# Patient Record
Sex: FEMALE | Race: WHITE
[De-identification: names, ages, dates, MRNs, and addresses within clinical notes are randomized per-mention and may not be internally consistent; named-entity substitution may affect disease eponyms.]

---

## 2017-03-14 NOTE — XR
EXAMINATION TYPE: XR foot complete RT

 

DATE OF EXAM: 3/13/2017 1:53 PM

 

CLINICAL HISTORY: Right foot pain in particular metatarsal pain for 6 months.

 

TECHNIQUE: Frontal, lateral, and oblique images of the right foot are obtained.

 

COMPARISON: None

 

FINDINGS:  There is no acute fracture/dislocation evident in the right foot.  The joint spaces in the
 right foot appear within normal limits. There is accessory ossicle at proximal medial base of navicu
lar bone or accessory navicular bone noted. The overlying soft tissue appears unremarkable.

 

IMPRESSION: Accessory navicular bone otherwise unremarkable study.

## 2017-03-22 NOTE — US
EXAMINATION TYPE: US abdomen complete

 

DATE OF EXAM: 3/22/2017 7:18 AM

 

COMPARISON: No previous

 

CLINICAL HISTORY: DR19.7 Diarrhea. Intermittent abdomen pain and diarrhea x 6 months

 

EXAM MEASUREMENTS:

 

Liver Length:  18.3 cm   

Gallbladder Wall:  0.3 cm   

CBD:  0.4 cm

Spleen:  12.4 cm   

Right Kidney:  12.1 x 5.6 x 6.2 cm 

Left Kidney:  11.7 x 5.5 x 5.0 cm   

 

TECHNOLOGIST IMPRESSION:  Technically difficult and limited study due to patient's body habitus

 

Pancreas:  visualized portions wnl, tail limited by overlying midline bowel gas

Liver:  enlarged at 18.3cm  the liver echotexture is coarse.

Gallbladder:  wnl

**Evidence for sonographic Calixto's sign:  yes

CBD:  visualized portions wnl, limited by overlying bowel gas 

Spleen:  visualized portions wnl, limited by rib shadowing and overlying bowel gas   

Right Kidney:  0.4cm echogenic focus mid pole   

Left Kidney:  wnl   

Upper IVC:  wnl  

Abd Aorta:  visualized portions wnl, limited by overlying midline bowel gas

 

There is no ascites.

 

The liver is homogenous.  The intrahepatic portion of the IVC and proximal abdominal aorta are within
 normal limits.  There is no evidence of cholelithiasis.  Common bile duct is unremarkable.  The visu
alized portions of the pancreas are homogenous.  The spleen is unremarkable.  Kidneys are symmetric a
nd free of hydronephrosis.  No renal lesions are seen.

 

IMPRESSION: Correlate for fatty infiltration of the liver. Difficult to exclude small nonobstructive 
calculus right kidney although findings are indeterminate. Technologist reports a positive sonographi
c Calixto's sign. Exam is limited.

## 2017-04-20 NOTE — MR
MR right foot

 

HISTORY: Pain in right foot

 

multiplanar multisequence imaging through the right foot, patient received 20 cc MultiHance IV

 

Comparison the right foot 13 March 2017

 

Soft tissue swelling is noted, there is subcutaneous edema present especially at the first digit vola
r aspect. The medial aspect of the foot level of the mid diaphysis of the first digit there is a sing
le irregularity present possibly susceptibility artifact, correlate with open wound or possibly forei
gn body within the skin. There is subcutaneous edema noted as well at the dorsum of the foot peripher
ally, and the level of the second digit more distally. Bone marrow signal is within normal limits. Fl
exor hallucis musculotendinous junction show some local edema, focal fluid signal within the substanc
e of the flexor hallucis longus at the level of the proximal phalangeal head and neck, coronal T2 miguel angel
ge 9 and sagittal STIR image 6 suggests an intrasubstance tear, no retracted tear. Some mild degenera
tive change present at the metatarsophalangeal joint of the first digit. Fluid signal is undercutting
 the plantar base of the first proximal phalanx compatible with normal synovial recess in the absence
 of injury, sagittal STIR image 5. However, given the adjacent soft tissue swelling, correlate with p
hysical exam findings and patient history to exclude a subtle partial tear of the plantar plate. Lisf
ranc ligament is intact.

 

Within the calcaneus there is some intermediate mixed signal on T1, increased signal on T2-weighted s
equences may represent prominent vascular remnants within the calcaneal body, possibly subchondral ge
ode which is incompletely evaluated. No evident abscess.

 

Intermetatarsal bursal effusions between the first through fourth toes.

 

IMPRESSION: Nonspecific soft tissue swelling. Additional findings above. Correlate clinically as desc
ribed. Possible skin abnormality as described, correlate.

## 2017-04-20 NOTE — MR
EXAMINATION TYPE: MR ankle RT wo/w con

 

DATE OF EXAM: 4/19/2017 8:33 PM

 

COMPARISON: NONE

 

HISTORY: Pain in Right ankle and joint of right kar

 

CONTRAST: 

Standard multiplanar, multisequence MRI departmental protocol utilizing 20 mL intravenous MultiHance 
gadolinium contrast.  

 

FINDINGS: Within the anterior portion of the os calcis there is a mixed signal T2 lesion which is of 
predominantly increased signal on T1-weighted imaging and fails to enhance following the administrati
on of contrast. There is no evidence for expansion or cortical disruption. The findings are nonspecif
ic however could reflect intraosseous ganglion cyst, atypical lipoma. The bone cyst and giant cell tu
mor. BE unlikely. No additional calcaneal lesions are seen. The ankle mortise is intact. Achilles ten
don has a normal appearance. Medial and lateral tendinous groups are intact. Osseous structures are o
therwise of normal signal without evidence for fracture or additional osseous lesion.

 

IMPRESSION: 

Nonspecific lesion of the right os calcis.

## 2019-09-14 ENCOUNTER — HOSPITAL ENCOUNTER (EMERGENCY)
Dept: HOSPITAL 47 - EC | Age: 29
Discharge: HOME | End: 2019-09-14
Payer: COMMERCIAL

## 2019-09-14 VITALS
SYSTOLIC BLOOD PRESSURE: 113 MMHG | TEMPERATURE: 98.8 F | RESPIRATION RATE: 18 BRPM | DIASTOLIC BLOOD PRESSURE: 74 MMHG | HEART RATE: 74 BPM

## 2019-09-14 DIAGNOSIS — M54.5: ICD-10-CM

## 2019-09-14 DIAGNOSIS — Z88.6: ICD-10-CM

## 2019-09-14 DIAGNOSIS — G89.29: ICD-10-CM

## 2019-09-14 DIAGNOSIS — Z87.39: ICD-10-CM

## 2019-09-14 DIAGNOSIS — M79.651: Primary | ICD-10-CM

## 2019-09-14 DIAGNOSIS — A49.02: ICD-10-CM

## 2019-09-14 DIAGNOSIS — F17.200: ICD-10-CM

## 2019-09-14 DIAGNOSIS — Z88.0: ICD-10-CM

## 2019-09-14 PROCEDURE — 99283 EMERGENCY DEPT VISIT LOW MDM: CPT

## 2019-09-14 NOTE — US
EXAMINATION TYPE: US venous doppler duplex LE RT

 

DATE OF EXAM: 9/14/2019 5:15 PM

 

COMPARISON: NONE

 

CLINICAL HISTORY: 29-year-old female Right thigh pain. Right leg pain.

 

SIDE PERFORMED: Right  

 

TECHNIQUE:  The lower extremity deep venous system is examined utilizing real time linear array sonog
jahaira with graded compression, doppler sonography and color-flow sonography.

 

FINDINGS:

 

VESSELS IMAGED:

External Iliac Vein (EIV)

Common Femoral Vein

Deep Femoral Vein

Greater Saphenous Vein *

Femoral Vein

Popliteal Vein

Small Saphenous Vein *

Proximal Calf Veins

(* superficial vessels)

 

 

 

Right Leg:  Negative for DVT

 

 

 

 

 

IMPRESSION:

No evidence for DVT within the right lower extremity imaged from the groin to the upper calf.

## 2019-09-14 NOTE — ED
General Adult HPI





- General


Chief complaint: Extremity Injury, Lower


Stated complaint: rt leg swelling


Time Seen by Provider: 09/14/19 16:25


Source: patient, family, RN notes reviewed


Mode of arrival: ambulatory


Limitations: no limitations





- History of Present Illness


Initial comments: 





Chief complaint and history of present illness a 29-year-old female here with 

her significant other.  The patient reports she has tenderness and discomfort to

her medial right thigh.  The patient reports she's recently started treatment 

for a repeat MRSA infection to her left cheek.  She's been resting it for 

several days.  Patient's weight she may have a clot in the right leg.  She also 

has had a chronic issue with low back discomfort no new injuries.  She has had 

sciatic distribution discomfort in the past.  No difficulties urinating or bowel

movements.  No rashes no direct injury.





- Related Data


                                Home Medications











 Medication  Instructions  Recorded  Confirmed


 


Acetaminophen Tab [Tylenol Tab] 1,000 mg PO Q6H PRN 09/14/19 09/14/19


 


Ibuprofen [Motrin Ib] 400 mg PO Q6H PRN 09/14/19 09/14/19


 


L.acidoph,Paracasei, B.lactis 1 cap PO BID 09/14/19 09/14/19





[Probiotic]   


 


Multivitamins, Thera [Multivitamin 1 tab PO HS 09/14/19 09/14/19





(formulary)]   


 


Sulfamethox-Tmp 800-160Mg [Bactrim 1 tab PO Q12H 09/14/19 09/14/19





-160 mg]   











                                    Allergies











Allergy/AdvReac Type Severity Reaction Status Date / Time


 


ketorolac [From Toradol] Allergy  Itching Verified 09/14/19 16:56


 


Penicillins Allergy  Swelling Verified 09/14/19 16:56














Review of Systems


ROS Statement: 


Those systems with pertinent positive or pertinent negative responses have been 

documented in the HPI.


Review of systems.  Patient denies any headache no visual acuity changes she has

discomfort to her left cheek where she has a MRSA infection currently being 

treated with Bactrim.  Denies sore throat no neck pain no chest pain no 

shortness of breath no abdominal pain.  She has chronic low back discomfort.  

She reports a history of sciatic discomfort.   No numbness no tingling to the 

leg.  Full motion of upper and lower extremities no joint pain.  All systems 

reviewed.





Past medical problems significant irritable bowel syndrome, currently not a 

problem.  Her surgeries include tonsils and adenoids she's had multiple I&D's 

for her MRSA infections she's had an EGD and a colonoscopy.  Family history 

significant for a mother who had lymphoma father had skin cancers of unknown t

ype.  The patient has ALLERGIES to ketorolac caused a rash and penicillins which

she cannot remember she was young that time.  The patient does smoke strongly 

encouraged to stop she denies alcohol use.


ROS Other: All systems not noted in ROS Statement are negative.





Past Medical History


Past Medical History: Osteoarthritis (OA)


Additional Past Medical History / Comment(s): IBS


History of Any Multi-Drug Resistant Organisms: MRSA


Date of last positivie culture/infection: 9/12/19


Past Surgical History: Adenoidectomy, Tonsillectomy


Additional Past Surgical History / Comment(s): multiple I&D, EGD colonoscopy


Past Psychological History: Anxiety, Bipolar


Smoking Status: Current every day smoker


Past Alcohol Use History: None Reported


Past Drug Use History: None Reported





General Exam





- General Exam Comments


Initial Comments: 





General:


The patient is awake and alert, here with her discomfort to her anterior right 

thigh.  Mildly increases with palpation decreases with the muscle flexed and 

palpation.  Vital signs show temperature of 98.8 pulse 74 respiratory rate 18 

pulse ox 98% room air blood pressure 113/74.


Eye:


Pupils are equal, round and reactive to light, extra-ocular movements are 

intact; there is normal conjunctiva bilaterally. No signs of icterus. 


Ears, nose, mouth and throat:


There are moist mucous membranes and no oral lesions.  MRSA infection left 

cheek.  Currently being treated with Bactrim.


Neck:


The neck is supple, there is no tenderness, no anterior cervical lymphadenopathy

appreciated.


Cardiovascular:


There is a regular rate and rhythm. No murmur, rub or gallop is appreciated.


Respiratory:


Lungs are clear to auscultation, respirations are non-labored, breath sounds are

equal. No wheezes, stridor, rales, or rhonchi.


Gastrointestinal:


Soft, non-distended, non-tender abdomen without masses or organomegaly noted. 

There is no rebound or guarding present. No CVA tenderness. Bowel sounds are 

unremarkable.


Back:


No rashes no complaint of discomfort other than chronic low back pain nothing 

increased.  States she has had sciatic distribution discomfort in the past.  

Denies numbness or tingling to her lower extremities.


Musculoskeletal:


Normal ROM, no tenderness, There is no pedal edema. There is no calf tenderness 

or swelling. Sensation intact. Pulses equal bilaterally 2+.  Mild tenderness 

deep palpation to the soft tissue in the proximal thigh radiating from the 

anterior right thigh to the medial aspect of the right knee.


Neurological:


No focal or lateralizing findings


Skin:


Skin is warm and dry and no rashes or lesions are noted. 


Psychiatric:


Cooperative,.


Limitations: no limitations





Course


                                   Vital Signs











  09/14/19





  16:17


 


Temperature 98.8 F


 


Pulse Rate 74


 


Respiratory 18





Rate 


 


Blood Pressure 113/74


 


O2 Sat by Pulse 98





Oximetry 














Medical Decision Making





- Medical Decision Making


Decision making; the patient is here because of discomfort to her proximal right

thigh as noted in the history and review of systems.  Examination was negative 

for any significant bruising swelling or pathology related to the leg.  Patient 

requested an ultrasound of the leg to rule out DVT this was performed no DVT was

noted.  The plant this time the patient keep leg elevated increase her general 

movement as she's been sitting still for 3 days because of discomfort to her 

cheek which she is being treated for MRSA infection.  Patient advised to do 

gentle exercises and ordered exercise or lower back we did discuss possibility 

of mild sciatic irritation causing discomfort and hand.  The patient will be 

advised to take ibuprofen 600 mg for pain.  The patient has an ALLERGY to 

ketorolac but she has taken ibuprofen without any adverse effects.  Patient was 

told to follow-up with family doctor as needed.  Return emergency room if the 

pain gets worse sheis a swelling etc.











Medical decision making; the patient had an ultrasound of her right leg to rule 

out DVT.  Radiologist's impression is no evidence for DVT in the right lower 

extremity image from groin to the upper calf.  As read by Dr. Tellez.











Disposition


Clinical Impression: 


 Acute pain of right thigh





Disposition: HOME SELF-CARE


Condition: Fair


Instructions (If sedation given, give patient instructions):  Musculoskeletal 

Pain (ED)


Additional Instructions: 


Gently move the leg and lower back.  Take ibuprofen as directed, 600 mg every 6 

hours, for pain.  Alternate heat and ice to lower back.  Report any changes to 

emergency room otherwise follow-up with family doctor


Is patient prescribed a controlled substance at d/c from ED?: No


Referrals: 


None,Stated [Primary Care Provider] - 1-2 days


Time of Disposition: 17:47

## 2020-08-11 ENCOUNTER — HOSPITAL ENCOUNTER (OUTPATIENT)
Dept: HOSPITAL 47 - RADECHMAIN | Age: 30
Discharge: HOME | End: 2020-08-11
Attending: FAMILY MEDICINE
Payer: COMMERCIAL

## 2020-08-11 DIAGNOSIS — R00.0: Primary | ICD-10-CM

## 2020-08-11 PROCEDURE — 93270 REMOTE 30 DAY ECG REV/REPORT: CPT

## 2020-08-27 NOTE — EM
EVENT MONITOR



Patient was monitored between 8/11 and 8/24/2020. The rhythm strip revealed sinus

mechanism with episode of sinus tachycardia.  There was one episode of 3 wide-complex

tachycardia noted on 8/21/2020. Symptoms of irregular beat or flutter correlated with

sinus mechanism.





MMGEORGE / VERON: [id number] / Job#: [id number]

## 2020-11-04 ENCOUNTER — HOSPITAL ENCOUNTER (INPATIENT)
Dept: HOSPITAL 47 - EC | Age: 30
LOS: 2 days | Discharge: HOME | DRG: 418 | End: 2020-11-06
Attending: SURGERY | Admitting: SURGERY
Payer: COMMERCIAL

## 2020-11-04 VITALS — BODY MASS INDEX: 50.2 KG/M2

## 2020-11-04 DIAGNOSIS — Z86.14: ICD-10-CM

## 2020-11-04 DIAGNOSIS — F31.9: ICD-10-CM

## 2020-11-04 DIAGNOSIS — Z80.7: ICD-10-CM

## 2020-11-04 DIAGNOSIS — Z88.6: ICD-10-CM

## 2020-11-04 DIAGNOSIS — Z87.891: ICD-10-CM

## 2020-11-04 DIAGNOSIS — Z91.012: ICD-10-CM

## 2020-11-04 DIAGNOSIS — K59.00: ICD-10-CM

## 2020-11-04 DIAGNOSIS — Z79.899: ICD-10-CM

## 2020-11-04 DIAGNOSIS — Z82.49: ICD-10-CM

## 2020-11-04 DIAGNOSIS — Z86.010: ICD-10-CM

## 2020-11-04 DIAGNOSIS — K80.00: Primary | ICD-10-CM

## 2020-11-04 DIAGNOSIS — Z80.9: ICD-10-CM

## 2020-11-04 DIAGNOSIS — M19.90: ICD-10-CM

## 2020-11-04 DIAGNOSIS — Z83.79: ICD-10-CM

## 2020-11-04 DIAGNOSIS — Z98.890: ICD-10-CM

## 2020-11-04 DIAGNOSIS — Z90.89: ICD-10-CM

## 2020-11-04 DIAGNOSIS — Z83.1: ICD-10-CM

## 2020-11-04 DIAGNOSIS — Z88.0: ICD-10-CM

## 2020-11-04 DIAGNOSIS — Z83.3: ICD-10-CM

## 2020-11-04 DIAGNOSIS — K57.90: ICD-10-CM

## 2020-11-04 DIAGNOSIS — N39.0: ICD-10-CM

## 2020-11-04 DIAGNOSIS — K58.1: ICD-10-CM

## 2020-11-04 DIAGNOSIS — K21.9: ICD-10-CM

## 2020-11-04 LAB
ALBUMIN SERPL-MCNC: 4.3 G/DL (ref 3.5–5)
ALP SERPL-CCNC: 67 U/L (ref 38–126)
ALT SERPL-CCNC: 16 U/L (ref 4–34)
AMYLASE SERPL-CCNC: 63 U/L (ref 30–110)
ANION GAP SERPL CALC-SCNC: 10 MMOL/L
AST SERPL-CCNC: 21 U/L (ref 14–36)
BASOPHILS # BLD AUTO: 0.1 K/UL (ref 0–0.2)
BASOPHILS NFR BLD AUTO: 1 %
BUN SERPL-SCNC: 14 MG/DL (ref 7–17)
CALCIUM SPEC-MCNC: 9.8 MG/DL (ref 8.4–10.2)
CHLORIDE SERPL-SCNC: 104 MMOL/L (ref 98–107)
CO2 SERPL-SCNC: 23 MMOL/L (ref 22–30)
EOSINOPHIL # BLD AUTO: 0.2 K/UL (ref 0–0.7)
EOSINOPHIL NFR BLD AUTO: 1 %
ERYTHROCYTE [DISTWIDTH] IN BLOOD BY AUTOMATED COUNT: 4.76 M/UL (ref 3.8–5.4)
ERYTHROCYTE [DISTWIDTH] IN BLOOD: 12.7 % (ref 11.5–15.5)
GLUCOSE SERPL-MCNC: 120 MG/DL (ref 74–99)
HCT VFR BLD AUTO: 41.7 % (ref 34–46)
HGB BLD-MCNC: 14 GM/DL (ref 11.4–16)
LIPASE SERPL-CCNC: 136 U/L (ref 23–300)
LYMPHOCYTES # SPEC AUTO: 3.1 K/UL (ref 1–4.8)
LYMPHOCYTES NFR SPEC AUTO: 17 %
MCH RBC QN AUTO: 29.4 PG (ref 25–35)
MCHC RBC AUTO-ENTMCNC: 33.5 G/DL (ref 31–37)
MCV RBC AUTO: 87.7 FL (ref 80–100)
MONOCYTES # BLD AUTO: 0.7 K/UL (ref 0–1)
MONOCYTES NFR BLD AUTO: 4 %
NEUTROPHILS # BLD AUTO: 13.6 K/UL (ref 1.3–7.7)
NEUTROPHILS NFR BLD AUTO: 76 %
PH UR: 5.5 [PH] (ref 5–8)
PLATELET # BLD AUTO: 351 K/UL (ref 150–450)
POTASSIUM SERPL-SCNC: 4.3 MMOL/L (ref 3.5–5.1)
PROT SERPL-MCNC: 7.6 G/DL (ref 6.3–8.2)
RBC UR QL: 1 /HPF (ref 0–5)
SODIUM SERPL-SCNC: 137 MMOL/L (ref 137–145)
SP GR UR: 1.03 (ref 1–1.03)
SQUAMOUS UR QL AUTO: 4 /HPF (ref 0–4)
UROBILINOGEN UR QL STRIP: <2 MG/DL (ref ?–2)
WBC # BLD AUTO: 17.9 K/UL (ref 3.8–10.6)
WBC #/AREA URNS HPF: 6 /HPF (ref 0–5)

## 2020-11-04 PROCEDURE — 82150 ASSAY OF AMYLASE: CPT

## 2020-11-04 PROCEDURE — 87086 URINE CULTURE/COLONY COUNT: CPT

## 2020-11-04 PROCEDURE — 76705 ECHO EXAM OF ABDOMEN: CPT

## 2020-11-04 PROCEDURE — 36415 COLL VENOUS BLD VENIPUNCTURE: CPT

## 2020-11-04 PROCEDURE — 80053 COMPREHEN METABOLIC PANEL: CPT

## 2020-11-04 PROCEDURE — 96365 THER/PROPH/DIAG IV INF INIT: CPT

## 2020-11-04 PROCEDURE — 88304 TISSUE EXAM BY PATHOLOGIST: CPT

## 2020-11-04 PROCEDURE — 81025 URINE PREGNANCY TEST: CPT

## 2020-11-04 PROCEDURE — 81001 URINALYSIS AUTO W/SCOPE: CPT

## 2020-11-04 PROCEDURE — 99285 EMERGENCY DEPT VISIT HI MDM: CPT

## 2020-11-04 PROCEDURE — 85025 COMPLETE CBC W/AUTO DIFF WBC: CPT

## 2020-11-04 PROCEDURE — 80048 BASIC METABOLIC PNL TOTAL CA: CPT

## 2020-11-04 PROCEDURE — 83690 ASSAY OF LIPASE: CPT

## 2020-11-04 PROCEDURE — 96375 TX/PRO/DX INJ NEW DRUG ADDON: CPT

## 2020-11-04 PROCEDURE — 96376 TX/PRO/DX INJ SAME DRUG ADON: CPT

## 2020-11-04 RX ADMIN — LEVOFLOXACIN SCH MLS/HR: 5 INJECTION, SOLUTION INTRAVENOUS at 17:10

## 2020-11-04 RX ADMIN — CEFAZOLIN SCH MLS/HR: 330 INJECTION, POWDER, FOR SOLUTION INTRAMUSCULAR; INTRAVENOUS at 13:55

## 2020-11-04 RX ADMIN — METRONIDAZOLE SCH MLS/HR: 500 INJECTION, SOLUTION INTRAVENOUS at 23:44

## 2020-11-04 RX ADMIN — ONDANSETRON PRN MG: 2 INJECTION INTRAMUSCULAR; INTRAVENOUS at 20:39

## 2020-11-04 RX ADMIN — CEFAZOLIN SCH MLS/HR: 330 INJECTION, POWDER, FOR SOLUTION INTRAMUSCULAR; INTRAVENOUS at 22:46

## 2020-11-04 RX ADMIN — METRONIDAZOLE SCH MLS/HR: 500 INJECTION, SOLUTION INTRAVENOUS at 18:47

## 2020-11-04 RX ADMIN — ONDANSETRON PRN MG: 2 INJECTION INTRAMUSCULAR; INTRAVENOUS at 06:52

## 2020-11-04 RX ADMIN — ACETAMINOPHEN PRN MG: 325 TABLET, FILM COATED ORAL at 18:46

## 2020-11-04 RX ADMIN — HYDROMORPHONE HYDROCHLORIDE PRN MG: 1 INJECTION, SOLUTION INTRAMUSCULAR; INTRAVENOUS; SUBCUTANEOUS at 08:38

## 2020-11-04 RX ADMIN — HYDROMORPHONE HYDROCHLORIDE PRN MG: 1 INJECTION, SOLUTION INTRAMUSCULAR; INTRAVENOUS; SUBCUTANEOUS at 12:40

## 2020-11-04 RX ADMIN — CEFAZOLIN SCH MLS/HR: 330 INJECTION, POWDER, FOR SOLUTION INTRAMUSCULAR; INTRAVENOUS at 05:43

## 2020-11-04 RX ADMIN — PANTOPRAZOLE SODIUM SCH MG: 40 INJECTION, POWDER, FOR SOLUTION INTRAVENOUS at 08:44

## 2020-11-04 RX ADMIN — HYDROMORPHONE HYDROCHLORIDE PRN MG: 1 INJECTION, SOLUTION INTRAMUSCULAR; INTRAVENOUS; SUBCUTANEOUS at 18:45

## 2020-11-04 RX ADMIN — ONDANSETRON PRN MG: 2 INJECTION INTRAMUSCULAR; INTRAVENOUS at 12:40

## 2020-11-04 NOTE — P.CONS
History of Present Illness





- Reason for Consult


Consult date: 11/04/20


Medical management





- Chief Complaint


Abdominal pain





- History of Present Illness





Patient is a 30-year-old female with a known history of GERD, osteoarthritis, 

history of gallstones and benign colonic polyps, previous history of smoking 

quit 5 months ago came to ER with the complaints of abdominal pain x2 days.  

Patient is having right upper quadrant abdominal pain associated with nausea and

vomiting.  Denied any fever or chills.  Patient reports that her symptoms 

started after she ate at [employer].


Denied any hematemesis or melena.  No dysuria or hematuria.


Laboratory data showed WBC 17.9, hemoglobin 14.0, platelets 351


Sodium 137, potassium 4.3, chloride 104 and BUN 14 creatinine 0.74


Urinalysis is cloudy with moderate leukocyte esterase and 6 WBCs.





Ultrasound abdomen showed there are several shadowing stones within the 

gallbladder dependently.  There is mild gallbladder wall thickening measuring up

to 5 mm and mild dilation of the gallbladder.  Sonographic Calixto sign is posit

kathryn.,  Suggesting acute cholecystitis.





Review of Systems








Constitutional: Patient denies any fever or chills .  No generalized weakness or

weight loss.  


Abdomen: Patient does have nausea vomiting and right upper quadrant abdominal 

pain.  No diarrhea..


Cardiovascular: Patient denies any chest pain or short of breath no 

palpitations.


Respiratory: patient denied any cough or sputum production.  No shortness of 

breath


Neurologic: Patient denied any numbness or tingling headache.


Musculoskeletal: Patient denies any complaints of joint swelling or deformity.


Skin: Negative


Psychiatric: Negative


Endocrine: No heat or cold intolerance.  No recent weight gain.


Genitourinary: No dysuria or hematuria.


All other 14 point ROS negative except the above





Past Medical History


Past Medical History: GERD/Reflux, Osteoarthritis (OA)


Additional Past Medical History / Comment(s): Gallstones, IBS, gastric and colon

polyps-benign, diverticular disease, arthritis in multiple joints, multiple 

MRSA/staph wounds in the past.


History of Any Multi-Drug Resistant Organisms: MRSA


Year Discovered:: 2020


MDRO Source:: face


Past Surgical History: Adenoidectomy, Tonsillectomy


Additional Past Surgical History / Comment(s): multiple I&D of MRSA/staph 

wounds, EGD,  colonoscopy


Additional Past Anesthesia/Blood Transfusion Reaction / Comm: Pt states she woke

quickly after colonoscopy


Smoking Status: Former smoker





- Past Family History


  ** Father


Family Medical History: Myocardial Infarction (MI)


Additional Family Medical History / Comment(s): 3 MIs with 1st while in his 50s.

 Bowel perforation, back surgery then complication of staph infection.





  ** Mother


Family Medical History: Cancer, Diabetes Mellitus


Additional Family Medical History / Comment(s): Nonhodgkins lymphoma.





Medications and Allergies


                                Home Medications











 Medication  Instructions  Recorded  Confirmed  Type


 


Acetaminophen Tab [Tylenol Tab] 1,000 mg PO Q6H PRN 09/14/19 11/04/20 History


 


Ibuprofen [Motrin Ib] 800 mg PO Q6H PRN 09/14/19 11/04/20 History


 


Omeprazole 20 mg PO DAILY 11/04/20 11/04/20 History








                                    Allergies











Allergy/AdvReac Type Severity Reaction Status Date / Time


 


egg Allergy  Nausea & Verified 11/04/20 06:15





   Vomiting  


 


ketorolac [From Toradol] Allergy  Itching Verified 11/04/20 06:15


 


Penicillins Allergy  Swelling Verified 11/04/20 06:15














Physical Exam


Vitals: 


                                   Vital Signs











  Temp Pulse Pulse Resp BP BP Pulse Ox


 


 11/04/20 08:45  98.4 F   75  18   123/69  74 L


 


 11/04/20 05:44   66   18  114/55   98


 


 11/04/20 02:41   96   20  140/61   97


 


 11/04/20 01:48  99 F  88   16  177/88   95








                                Intake and Output











 11/03/20 11/04/20 11/04/20





 22:59 06:59 14:59


 


Other:   


 


  Weight  158.757 kg 158.757 kg














PHYSICAL EXAMINATION: 


Patient is lying in the bed comfortably, no acute distress, awake alert and 

oriented.. 


HEENT: Normocephalic. Neck is supple. Pupils reactive. Nostrils clear. Oral 

cavity is moist. Ears reveal no drainage. 


Neck reveals no JVD, carotid bruits, or thyromegaly. 


CHEST EXAMINATION: Trachea is central. Symmetrical expansion. Lung fields clear 

to auscultation and percussion. 


CARDIAC: Normal S1, S2 with no gallops. No murmurs 


ABDOMEN: Soft. Right upper quadrant tenderness. Bowel sounds normal. No 

organomegaly. No abdominal bruits. 


Extremities: reveal no edema.  No clubbing or cyanosis


Neurologically awake, alert, oriented x3 with well-coordinated movements.  No 

focal deficits noted


Skin: No rash or skin lesions. 


Psychiatric: Coperative.  Nonsuicidal


Musculoskeletal: No joint swelling or deformity.  Normal range of motion.





Results


CBC & Chem 7: 


                                 11/04/20 02:22





                                 11/04/20 02:22


Labs: 


                  Abnormal Lab Results - Last 24 Hours (Table)











  11/04/20 11/04/20 11/04/20 Range/Units





  02:22 02:22 04:10 


 


WBC  17.9 H    (3.8-10.6)  k/uL


 


Neutrophils #  13.6 H    (1.3-7.7)  k/uL


 


Glucose   120 H   (74-99)  mg/dL


 


Urine Appearance    Cloudy H  (Clear)  


 


Urine Protein    Trace H  (Negative)  


 


Urine Blood    Trace H  (Negative)  


 


Ur Leukocyte Esterase    Moderate H  (Negative)  


 


Urine WBC    6 H  (0-5)  /hpf


 


Urine Bacteria    Many H  (None)  /hpf


 


Urine Mucus    Few H  (None)  /hpf














Assessment and Plan


Assessment: 





Acute cholecystitis


Nausea vomiting and right upper quadrant abdominal pain secondary to above


Leukocytosis


GERD


Osteoarthritis


History of gallstones,


Benign colonic polyps


Previous history of smoking


DVT prophylaxis.





Plan:


Patient will be continued on IV hydration and pain management.  Will start on 

antibiotics and follow-up closely.  General surgery is planning for 

cholecystectomy.  Monitor CBC and BMP tomorrow.  Further recommendations based 

on clinical course.





Thank you for your consult.

## 2020-11-04 NOTE — US
EXAM:

  US Abdomen Complete

 

CLINICAL HISTORY:

  ITS.REASON US Reason: attention RUQ

 

TECHNIQUE:

  Real-time ultrasound of the abdomen with image documentation.

 

COMPARISON:

  No relevant prior studies available.

 

FINDINGS:

  Liver:  The liver measures 19.6 cm with mildly increased echogenicity 

suggesting mild fatty infiltration.  No focal liver lesion is seen.  No 

intrahepatic bile duct dilation.

  Gallbladder:  There are several shadowing stones within the gallbladder 

dependently.  There is mild gallbladder wall thickening measuring up to 5 

mm and mild dilation of the gallbladder.  Sonographic Calixto sign is 

positive suggesting acute cholecystitis.

  Common bile duct:  Unremarkable as visualized.  No stones.  No dilation.

 

  Pancreas:  Unremarkable as visualized.

  Kidneys:  The right kidney measures 12 x 5 cm with normal appearance.  

No hydronephrosis.  No stones.

  Spleen:  Unremarkable.  No splenomegaly.

  Aorta:  Unremarkable.  No aneurysm.

  Inferior vena cava:  Unremarkable.

 

IMPRESSION:     

  There are several shadowing stones within the gallbladder dependently.  

There is mild gallbladder wall thickening measuring up to 5 mm and mild 

dilation of the gallbladder.  Sonographic Calixto sign is positive 

suggesting acute cholecystitis.

## 2020-11-04 NOTE — P.GSHP
History of Present Illness


H&P Date: 11/04/20





CHIEF COMPLAINT: Right upper quadrant abdominal pain





HISTORY OF PRESENT ILLNESS: This is a 30-year-old female with a known history of

gallstones, IBS, GERD, cardiac arrhythmia osteoarthritis.  Patient presents to 

the emergency room with a 2 day history of right upper quadrant abdominal pain 

with severe vomiting.  Patient reports that her symptoms did start after eating 

[employer].  She had been vomiting excessively and did note some blood in her 

emesis.  She denies any fever, chills or sweats.  Denies any change in stools.  

Denies any urinary symptoms.





PAST MEDICAL HISTORY: 


See list.





PAST SURGICAL HISTORY: 


See list.





MEDICATIONS: 


See list.





ALLERGIES: 


See list.





SOCIAL HISTORY: No illicit drug use.  





REVIEW OF SYSTEMS: 


CONSTITUTIONAL: Denies fever or chills.


HEENT: Denies blurred vision, vision changes, or eye pain. Denies hemoptysis 


CARDIOVASCULAR: Denies chest pain or pressure.


RESPIRATORY: No shortness of breath. 


GASTROINTESTINAL: See HPI for pertinent findings


HEMATOLOGIC: Denies bleeding disorders.


GENITOURINARY:  Denies any blood in urine or increased urinary frequency.  


SKIN: Denies pruitis. Denies rash.





PHYSICAL EXAM: 


VITAL SIGNS: Reviewed


GENERAL: Well-developed in no acute distress. 


HEENT:  No sclera icterus. Extraocular movements grossly intact.  Moist buccal 

mucosa. Head is atraumatic, normocephalic. No nasal drainage.


ABDOMEN:  Soft.  Right upper quadrant and epigastric tenderness with palpation. 

Nondistended


NEUROLOGIC:  Alert and oriented.  Cranial nerves II through XII grossly intact.





LABORATORY DATA:


WBC 17.9 hemoglobin 14.0 platelets 351 LFTs normal lipase 136





IMAGING:


Abdominal ultrasound there are several shadowing stones within the gallbladder 

dependently.  There is mild gallbladder wall thickening measuring up to 5 mm and

mild dilation of the gallbladder.  Positive Calixto sign


Urinalysis moderate leukocyte esterase WBC 6





ASSESSMENT: 


1.  Acute cholecystitis


2.  Right upper quadrant abdominal pain





PLAN: 


-Planning for laparoscopic cholecystectomy later today with Dr. Arriaga


-Keep patient nothing by mouth


-Continue IV fluids


-Continue Dilaudid IV as needed for pain


-Check EKG patient has history of arrhythmia


-Consult medicine for medical management





Physician Assistant note has been reviewed by physician. Signing provider agrees

with the documented findings, assessment, and plan of care. 





Past Medical History


Past Medical History: GERD/Reflux, Osteoarthritis (OA)


Additional Past Medical History / Comment(s): Gallstones, IBS, gastric and colon

polyps-benign, diverticular disease, arthritis in multiple joints, multiple 

MRSA/staph wounds in the past.


History of Any Multi-Drug Resistant Organisms: MRSA


Date of last positivie culture/infection: 2020


MDRO Source:: face


Past Surgical History: Adenoidectomy, Tonsillectomy


Additional Past Surgical History / Comment(s): multiple I&D of MRSA/staph 

wounds, EGD,  colonoscopy


Additional Past Anesthesia/Blood Transfusion Reaction / Comment(s): Pt states 

she woke quickly after colonoscopy


Smoking Status: Former smoker





- Past Family History


  ** Father


Family Medical History: Myocardial Infarction (MI)


Additional Family Medical History / Comment(s): 3 MIs with 1st while in his 50s.

 Bowel perforation, back surgery then complication of staph infection.





  ** Mother


Family Medical History: Cancer, Diabetes Mellitus


Additional Family Medical History / Comment(s): Nonhodgkins lymphoma.





Medications and Allergies


                                Home Medications











 Medication  Instructions  Recorded  Confirmed  Type


 


Acetaminophen Tab [Tylenol Tab] 1,000 mg PO Q6H PRN 09/14/19 11/04/20 History


 


Ibuprofen [Motrin Ib] 800 mg PO Q6H PRN 09/14/19 11/04/20 History


 


Omeprazole 20 mg PO DAILY 11/04/20 11/04/20 History








                                    Allergies











Allergy/AdvReac Type Severity Reaction Status Date / Time


 


egg Allergy  Nausea & Verified 11/04/20 06:15





   Vomiting  


 


ketorolac [From Toradol] Allergy  Itching Verified 11/04/20 06:15


 


Penicillins Allergy  Swelling Verified 11/04/20 06:15














Surgical - Exam


                                   Vital Signs











Temp Pulse Resp BP Pulse Ox


 


 99 F   88   16   177/88   95 


 


 11/04/20 01:48  11/04/20 01:48  11/04/20 01:48  11/04/20 01:48  11/04/20 01:48














Results





- Labs





                                 11/04/20 02:22





                                 11/04/20 02:22


                  Abnormal Lab Results - Last 24 Hours (Table)











  11/04/20 11/04/20 11/04/20 Range/Units





  02:22 02:22 04:10 


 


WBC  17.9 H    (3.8-10.6)  k/uL


 


Neutrophils #  13.6 H    (1.3-7.7)  k/uL


 


Glucose   120 H   (74-99)  mg/dL


 


Urine Appearance    Cloudy H  (Clear)  


 


Urine Protein    Trace H  (Negative)  


 


Urine Blood    Trace H  (Negative)  


 


Ur Leukocyte Esterase    Moderate H  (Negative)  


 


Urine WBC    6 H  (0-5)  /hpf


 


Urine Bacteria    Many H  (None)  /hpf


 


Urine Mucus    Few H  (None)  /hpf








                                 Diabetes panel











  11/04/20 Range/Units





  02:22 


 


Sodium  137  (137-145)  mmol/L


 


Potassium  4.3  (3.5-5.1)  mmol/L


 


Chloride  104  ()  mmol/L


 


Carbon Dioxide  23  (22-30)  mmol/L


 


BUN  14  (7-17)  mg/dL


 


Creatinine  0.74  (0.52-1.04)  mg/dL


 


Glucose  120 H  (74-99)  mg/dL


 


Calcium  9.8  (8.4-10.2)  mg/dL


 


AST  21  (14-36)  U/L


 


ALT  16  (4-34)  U/L


 


Alkaline Phosphatase  67  ()  U/L


 


Total Protein  7.6  (6.3-8.2)  g/dL


 


Albumin  4.3  (3.5-5.0)  g/dL








                                  Calcium panel











  11/04/20 Range/Units





  02:22 


 


Calcium  9.8  (8.4-10.2)  mg/dL


 


Albumin  4.3  (3.5-5.0)  g/dL








                                 Pituitary panel











  11/04/20 Range/Units





  02:22 


 


Sodium  137  (137-145)  mmol/L


 


Potassium  4.3  (3.5-5.1)  mmol/L


 


Chloride  104  ()  mmol/L


 


Carbon Dioxide  23  (22-30)  mmol/L


 


BUN  14  (7-17)  mg/dL


 


Creatinine  0.74  (0.52-1.04)  mg/dL


 


Glucose  120 H  (74-99)  mg/dL


 


Calcium  9.8  (8.4-10.2)  mg/dL








                                  Adrenal panel











  11/04/20 Range/Units





  02:22 


 


Sodium  137  (137-145)  mmol/L


 


Potassium  4.3  (3.5-5.1)  mmol/L


 


Chloride  104  ()  mmol/L


 


Carbon Dioxide  23  (22-30)  mmol/L


 


BUN  14  (7-17)  mg/dL


 


Creatinine  0.74  (0.52-1.04)  mg/dL


 


Glucose  120 H  (74-99)  mg/dL


 


Calcium  9.8  (8.4-10.2)  mg/dL


 


Total Bilirubin  0.5  (0.2-1.3)  mg/dL


 


AST  21  (14-36)  U/L


 


ALT  16  (4-34)  U/L


 


Alkaline Phosphatase  67  ()  U/L


 


Total Protein  7.6  (6.3-8.2)  g/dL


 


Albumin  4.3  (3.5-5.0)  g/dL

## 2020-11-04 NOTE — ED
Abdominal Pain HPI





- General


Chief Complaint: Abdominal Pain


Stated Complaint: Rt flank pain


Time Seen by Provider: 20 01:54


Source: patient


Mode of arrival: ambulatory


Limitations: no limitations





- History of Present Illness


MD Complaint: abdominal pain


Onset/Timin


-: hour(s)


Location: RUQ


Radiation: none


Migration to: no migration


Severity: severe


Quality: stabbing, burning


Consistency: colicky


Improves With: nothing


Worsens With: nothing


Associated Symptoms: nausea, vomiting





- Related Data


                                Home Medications











 Medication  Instructions  Recorded  Confirmed


 


Acetaminophen Tab [Tylenol] 1,000 mg PO Q6H PRN 19


 


Ibuprofen [Motrin Ib] 800 mg PO Q6H PRN 19


 


Omeprazole 20 mg PO DAILY 20











                                    Allergies











Allergy/AdvReac Type Severity Reaction Status Date / Time


 


egg Allergy  Nausea & Verified 20 10:50





   Vomiting  


 


ketorolac [From Toradol] Allergy  Itching Verified 20 10:50


 


Penicillins Allergy  Swelling Verified 20 10:50














Review of Systems


ROS Statement: 


Those systems with pertinent positive or pertinent negative responses have been 

documented in the HPI.





ROS Other: All systems not noted in ROS Statement are negative.


Constitutional: Reports: chills.  Denies: fever


Respiratory: Denies: cough, dyspnea


Cardiovascular: Denies: chest pain, palpitations, edema


Gastrointestinal: Reports: abdominal pain, nausea, vomiting.  Denies: diarrhea, 

constipation, hematemesis, melena, hematochezia


Genitourinary: Denies: dysuria, frequency, hematuria


Musculoskeletal: Denies: back pain


Skin: Denies: rash


Neurological: Denies: headache, weakness, numbness





Past Medical History


Past Medical History: Osteoarthritis (OA)


Additional Past Medical History / Comment(s): IBS, gallstones


History of Any Multi-Drug Resistant Organisms: MRSA


Date of last positivie culture/infection: 19


Past Surgical History: Adenoidectomy, Tonsillectomy


Additional Past Surgical History / Comment(s): multiple I&D, EGD colonoscopy


Past Psychological History: Anxiety, Bipolar


Past Alcohol Use History: None Reported


Past Drug Use History: None Reported





- Past Family History


  ** Father


Family Medical History: Myocardial Infarction (MI)


Additional Family Medical History / Comment(s): 3 MIs with 1st while in his 50s.

 Bowel perforation, back surgery then complication of staph infection.





  ** Mother


Family Medical History: Cancer, Diabetes Mellitus


Additional Family Medical History / Comment(s): Nonhodgkins lymphoma.





General Exam


Limitations: no limitations


General appearance: alert, in no apparent distress


Head exam: Present: atraumatic, normocephalic


Eye exam: Present: normal appearance.  Absent: scleral icterus, conjunctival 

injection


Neck exam: Present: normal inspection


Respiratory exam: Present: normal lung sounds bilaterally.  Absent: respiratory 

distress, wheezes, rales, rhonchi, stridor


Cardiovascular Exam: Present: regular rate, normal rhythm, normal heart sounds. 

Absent: systolic murmur, diastolic murmur, rubs, gallop


GI/Abdominal exam: Present: soft, tenderness (right upper quadrant tenderness), 

normal bowel sounds.  Absent: distended, guarding, rebound, rigid, mass, 

pulsatile mass, hernia


Extremities exam: Present: normal inspection, normal capillary refill.  Absent: 

pedal edema, calf tenderness


Back exam: Present: normal inspection.  Absent: CVA tenderness (R), CVA 

tenderness (L)


Neurological exam: Present: alert


Skin exam: Present: warm, dry, intact, normal color.  Absent: rash





Course


                                   Vital Signs











  20





  01:48 02:41 05:44


 


Temperature 99 F  


 


Pulse Rate 88 96 66


 


Respiratory 16 20 18





Rate   


 


Blood Pressure 177/88 140/61 114/55


 


O2 Sat by Pulse 95 97 98





Oximetry   














Medical Decision Making





- Lab Data


Result diagrams: 


                                 20 09:23





                                 20 09:23


                                   Lab Results











  20 Range/Units





  02:22 02:22 04:10 


 


WBC  17.9 H    (3.8-10.6)  k/uL


 


RBC  4.76    (3.80-5.40)  m/uL


 


Hgb  14.0    (11.4-16.0)  gm/dL


 


Hct  41.7    (34.0-46.0)  %


 


MCV  87.7    (80.0-100.0)  fL


 


MCH  29.4    (25.0-35.0)  pg


 


MCHC  33.5    (31.0-37.0)  g/dL


 


RDW  12.7    (11.5-15.5)  %


 


Plt Count  351    (150-450)  k/uL


 


Neutrophils %  76    %


 


Lymphocytes %  17    %


 


Monocytes %  4    %


 


Eosinophils %  1    %


 


Basophils %  1    %


 


Neutrophils #  13.6 H    (1.3-7.7)  k/uL


 


Lymphocytes #  3.1    (1.0-4.8)  k/uL


 


Monocytes #  0.7    (0-1.0)  k/uL


 


Eosinophils #  0.2    (0-0.7)  k/uL


 


Basophils #  0.1    (0-0.2)  k/uL


 


Sodium   137   (137-145)  mmol/L


 


Potassium   4.3   (3.5-5.1)  mmol/L


 


Chloride   104   ()  mmol/L


 


Carbon Dioxide   23   (22-30)  mmol/L


 


Anion Gap   10   mmol/L


 


BUN   14   (7-17)  mg/dL


 


Creatinine   0.74   (0.52-1.04)  mg/dL


 


Est GFR (CKD-EPI)AfAm   >90   (>60 ml/min/1.73 sqM)  


 


Est GFR (CKD-EPI)NonAf   >90   (>60 ml/min/1.73 sqM)  


 


Glucose   120 H   (74-99)  mg/dL


 


Calcium   9.8   (8.4-10.2)  mg/dL


 


Total Bilirubin   0.5   (0.2-1.3)  mg/dL


 


AST   21   (14-36)  U/L


 


ALT   16   (4-34)  U/L


 


Alkaline Phosphatase   67   ()  U/L


 


Total Protein   7.6   (6.3-8.2)  g/dL


 


Albumin   4.3   (3.5-5.0)  g/dL


 


Amylase   63   ()  U/L


 


Lipase   136   ()  U/L


 


Urine Color    Yellow  


 


Urine Appearance    Cloudy H  (Clear)  


 


Urine pH    5.5  (5.0-8.0)  


 


Ur Specific Gravity    1.030  (1.001-1.035)  


 


Urine Protein    Trace H  (Negative)  


 


Urine Glucose (UA)    Negative  (Negative)  


 


Urine Ketones    Negative  (Negative)  


 


Urine Blood    Trace H  (Negative)  


 


Urine Nitrite    Negative  (Negative)  


 


Urine Bilirubin    Negative  (Negative)  


 


Urine Urobilinogen    <2.0  (<2.0)  mg/dL


 


Ur Leukocyte Esterase    Moderate H  (Negative)  


 


Urine RBC    1  (0-5)  /hpf


 


Urine WBC    6 H  (0-5)  /hpf


 


Ur Squamous Epith Cells    4  (0-4)  /hpf


 


Urine Bacteria    Many H  (None)  /hpf


 


Urine Mucus    Few H  (None)  /hpf


 


Urine HCG, Qual     (Not Detectd)  














  20 Range/Units





  04:10 


 


WBC   (3.8-10.6)  k/uL


 


RBC   (3.80-5.40)  m/uL


 


Hgb   (11.4-16.0)  gm/dL


 


Hct   (34.0-46.0)  %


 


MCV   (80.0-100.0)  fL


 


MCH   (25.0-35.0)  pg


 


MCHC   (31.0-37.0)  g/dL


 


RDW   (11.5-15.5)  %


 


Plt Count   (150-450)  k/uL


 


Neutrophils %   %


 


Lymphocytes %   %


 


Monocytes %   %


 


Eosinophils %   %


 


Basophils %   %


 


Neutrophils #   (1.3-7.7)  k/uL


 


Lymphocytes #   (1.0-4.8)  k/uL


 


Monocytes #   (0-1.0)  k/uL


 


Eosinophils #   (0-0.7)  k/uL


 


Basophils #   (0-0.2)  k/uL


 


Sodium   (137-145)  mmol/L


 


Potassium   (3.5-5.1)  mmol/L


 


Chloride   ()  mmol/L


 


Carbon Dioxide   (22-30)  mmol/L


 


Anion Gap   mmol/L


 


BUN   (7-17)  mg/dL


 


Creatinine   (0.52-1.04)  mg/dL


 


Est GFR (CKD-EPI)AfAm   (>60 ml/min/1.73 sqM)  


 


Est GFR (CKD-EPI)NonAf   (>60 ml/min/1.73 sqM)  


 


Glucose   (74-99)  mg/dL


 


Calcium   (8.4-10.2)  mg/dL


 


Total Bilirubin   (0.2-1.3)  mg/dL


 


AST   (14-36)  U/L


 


ALT   (4-34)  U/L


 


Alkaline Phosphatase   ()  U/L


 


Total Protein   (6.3-8.2)  g/dL


 


Albumin   (3.5-5.0)  g/dL


 


Amylase   ()  U/L


 


Lipase   ()  U/L


 


Urine Color   


 


Urine Appearance   (Clear)  


 


Urine pH   (5.0-8.0)  


 


Ur Specific Gravity   (1.001-1.035)  


 


Urine Protein   (Negative)  


 


Urine Glucose (UA)   (Negative)  


 


Urine Ketones   (Negative)  


 


Urine Blood   (Negative)  


 


Urine Nitrite   (Negative)  


 


Urine Bilirubin   (Negative)  


 


Urine Urobilinogen   (<2.0)  mg/dL


 


Ur Leukocyte Esterase   (Negative)  


 


Urine RBC   (0-5)  /hpf


 


Urine WBC   (0-5)  /hpf


 


Ur Squamous Epith Cells   (0-4)  /hpf


 


Urine Bacteria   (None)  /hpf


 


Urine Mucus   (None)  /hpf


 


Urine HCG, Qual  Not Detected  (Not Detectd)  














Disposition


Clinical Impression: 


 Abdominal pain, Acute cholecystitis





Disposition: ADMITTED AS IP TO THIS HOSP


Condition: Fair

## 2020-11-05 LAB
ANION GAP SERPL CALC-SCNC: 4 MMOL/L
BASOPHILS # BLD AUTO: 0 K/UL (ref 0–0.2)
BASOPHILS NFR BLD AUTO: 0 %
BUN SERPL-SCNC: 14 MG/DL (ref 7–17)
CALCIUM SPEC-MCNC: 8.4 MG/DL (ref 8.4–10.2)
CHLORIDE SERPL-SCNC: 107 MMOL/L (ref 98–107)
CO2 SERPL-SCNC: 28 MMOL/L (ref 22–30)
EOSINOPHIL # BLD AUTO: 0.2 K/UL (ref 0–0.7)
EOSINOPHIL NFR BLD AUTO: 2 %
ERYTHROCYTE [DISTWIDTH] IN BLOOD BY AUTOMATED COUNT: 3.99 M/UL (ref 3.8–5.4)
ERYTHROCYTE [DISTWIDTH] IN BLOOD: 12.6 % (ref 11.5–15.5)
GLUCOSE SERPL-MCNC: 96 MG/DL (ref 74–99)
HCT VFR BLD AUTO: 36.2 % (ref 34–46)
HGB BLD-MCNC: 11.6 GM/DL (ref 11.4–16)
LYMPHOCYTES # SPEC AUTO: 3.2 K/UL (ref 1–4.8)
LYMPHOCYTES NFR SPEC AUTO: 33 %
MCH RBC QN AUTO: 29.1 PG (ref 25–35)
MCHC RBC AUTO-ENTMCNC: 32 G/DL (ref 31–37)
MCV RBC AUTO: 90.8 FL (ref 80–100)
MONOCYTES # BLD AUTO: 0.6 K/UL (ref 0–1)
MONOCYTES NFR BLD AUTO: 6 %
NEUTROPHILS # BLD AUTO: 5.7 K/UL (ref 1.3–7.7)
NEUTROPHILS NFR BLD AUTO: 58 %
PLATELET # BLD AUTO: 272 K/UL (ref 150–450)
POTASSIUM SERPL-SCNC: 4.1 MMOL/L (ref 3.5–5.1)
SODIUM SERPL-SCNC: 139 MMOL/L (ref 137–145)
WBC # BLD AUTO: 9.8 K/UL (ref 3.8–10.6)

## 2020-11-05 PROCEDURE — 0FT44ZZ RESECTION OF GALLBLADDER, PERCUTANEOUS ENDOSCOPIC APPROACH: ICD-10-PCS

## 2020-11-05 RX ADMIN — METRONIDAZOLE SCH MLS/HR: 500 INJECTION, SOLUTION INTRAVENOUS at 08:41

## 2020-11-05 RX ADMIN — ACETAMINOPHEN PRN MG: 325 TABLET, FILM COATED ORAL at 18:04

## 2020-11-05 RX ADMIN — METRONIDAZOLE SCH MLS/HR: 500 INJECTION, SOLUTION INTRAVENOUS at 16:40

## 2020-11-05 RX ADMIN — CEFAZOLIN SCH MLS/HR: 330 INJECTION, POWDER, FOR SOLUTION INTRAMUSCULAR; INTRAVENOUS at 06:14

## 2020-11-05 RX ADMIN — LEVOFLOXACIN SCH MLS/HR: 5 INJECTION, SOLUTION INTRAVENOUS at 17:59

## 2020-11-05 RX ADMIN — ONDANSETRON PRN MG: 2 INJECTION INTRAMUSCULAR; INTRAVENOUS at 08:49

## 2020-11-05 RX ADMIN — HYDROMORPHONE HYDROCHLORIDE PRN MG: 1 INJECTION, SOLUTION INTRAMUSCULAR; INTRAVENOUS; SUBCUTANEOUS at 08:49

## 2020-11-05 RX ADMIN — HYDROMORPHONE HYDROCHLORIDE PRN MG: 1 INJECTION, SOLUTION INTRAMUSCULAR; INTRAVENOUS; SUBCUTANEOUS at 14:56

## 2020-11-05 RX ADMIN — PANTOPRAZOLE SODIUM SCH MG: 40 INJECTION, POWDER, FOR SOLUTION INTRAVENOUS at 08:48

## 2020-11-05 RX ADMIN — HYDROMORPHONE HYDROCHLORIDE PRN MG: 1 INJECTION, SOLUTION INTRAMUSCULAR; INTRAVENOUS; SUBCUTANEOUS at 01:59

## 2020-11-05 RX ADMIN — HYDROMORPHONE HYDROCHLORIDE PRN MG: 1 INJECTION, SOLUTION INTRAMUSCULAR; INTRAVENOUS; SUBCUTANEOUS at 18:04

## 2020-11-05 RX ADMIN — PANTOPRAZOLE SODIUM SCH MG: 40 INJECTION, POWDER, FOR SOLUTION INTRAVENOUS at 08:42

## 2020-11-05 NOTE — P.PN
Subjective


Progress Note Date: 11/05/20





Patient is a 30-year-old female with a known history of GERD, osteoarthritis, 

history of gallstones and benign colonic polyps, previous history of smoking 

quit 5 months ago came to ER with the complaints of abdominal pain x2 days.  

Patient is having right upper quadrant abdominal pain associated with nausea and

vomiting.  Denied any fever or chills.  Patient reports that her symptoms 

started after she ate at [employer].


Denied any hematemesis or melena.  No dysuria or hematuria.


Laboratory data showed WBC 17.9, hemoglobin 14.0, platelets 351


Sodium 137, potassium 4.3, chloride 104 and BUN 14 creatinine 0.74


Urinalysis is cloudy with moderate leukocyte esterase and 6 WBCs.





Ultrasound abdomen showed there are several shadowing stones within the 

gallbladder dependently.  There is mild gallbladder wall thickening measuring up

to 5 mm and mild dilation of the gallbladder.  Sonographic Calixto sign is 

positive.,  Suggesting acute cholecystitis.





11/5/2020


Patient is currently sitting on the side of the bed.  Complains of abdominal 

pain.  Patient had laparoscopic cholecystectomy today.  No complaints of fever.


No chest pain or shortness breath.  No dizziness or lightheadedness.


Leukocytosis resolved.  Patient is being continued on antibiotics of Levaquin 

and Flagyl.





Current medications reviewed.





Objective





- Vital Signs


Vital signs: 


                                   Vital Signs











Temp  98.5 F   11/05/20 08:38


 


Pulse  72   11/05/20 09:00


 


Resp  16   11/05/20 09:00


 


BP  114/64   11/05/20 08:38


 


Pulse Ox  96   11/05/20 08:38








                                 Intake & Output











 11/04/20 11/05/20 11/05/20





 18:59 06:59 18:59


 


Intake Total 1528  480


 


Output Total 2 200 


 


Balance 1526 -200 480


 


Weight 158.757 kg  


 


Intake:   


 


  Intake, IV Titration 1048  





  Amount   


 


    Sodium Chloride 0.9% 1, 1048  





    000 ml @ 130 mls/hr IV .   





    Q7H42M [facility] Rx#:[id number]   


 


  Oral 480  480


 


Output:   


 


  Urine 2 200 


 


Other:   


 


  Voiding Method  Toilet Toilet


 


  # Voids  2 














- Exam





PHYSICAL EXAMINATION: 


Patient is lying in the bed comfortably, no acute distress, awake alert and 

oriented.. 


HEENT: Normocephalic. Neck is supple. Pupils reactive. Nostrils clear. Oral 

cavity is moist. Ears reveal no drainage. 


Neck reveals no JVD, carotid bruits, or thyromegaly. 


CHEST EXAMINATION: Trachea is central. Symmetrical expansion. Lung fields clear 

to auscultation and percussion. 


CARDIAC: Normal S1, S2 with no gallops. No murmurs 


ABDOMEN: Soft. mild Right upper quadrant tenderness. Bowel sounds normal. No 

organomegaly. No abdominal bruits. 


Extremities: reveal no edema.  No clubbing or cyanosis


Neurologically awake, alert, oriented x3 with well-coordinated movements.  No 

focal deficits noted


Skin: No rash or skin lesions. 


Psychiatric: Coperative.  Nonsuicidal


Musculoskeletal: No joint swelling or deformity.  Normal range of motion.








- Labs


CBC & Chem 7: 


                                 11/05/20 09:23





                                 11/05/20 09:23


Labs: 


                      Microbiology - Last 24 Hours (Table)











 11/04/20 04:10 Urine Culture - Preliminary





 Urine,Clean Catch 














Assessment and Plan


Assessment: 





Acute cholecystitis. Status post laparoscopic cholecystectomy.  POD 0


Nausea vomiting and right upper quadrant abdominal pain secondary to above


Leukocytosis


GERD


Osteoarthritis


History of gallstones,


Benign colonic polyps


Previous history of smoking


DVT prophylaxis.





Plan:


Patient will be continued on IV hydration and pain management.  c/w  antibiotics

and follow-up closely.  s/p cholecystectomy.  


Monitor CBC and BMP tomorrow.  Further recommendations based on clinical course.





Time with Patient: Greater than 30

## 2020-11-05 NOTE — P.OP
Date of Procedure: 11/05/20


Preoperative Diagnosis: 


Cholecystitis


Postoperative Diagnosis: 


Cholecystitis


Procedure(s) Performed: 


Laparoscopic cholecystectomy


Anesthesia: KENNA


Surgeon: Jairo Arriaga


Pathology: other (gall bladder)


Condition: stable


Disposition: PACU


Description of Procedure: 


The patient was placed on the operating table.   The patient received a general 

endotracheal tube anesthesia.    The patients abdomen was prepped and draped in

the usual sterile fashion.    Through an infraumbilical stab incision, the 

fascia of the anterior abdominal wall was grasped with a pair of Kochers and 

then the Veress needle was placed in the peritoneal cavity.   Position of the 

Veress needle was confirmed with positive drop test.   The abdomen was then 

insufflated.  After adequate insufflation, the 10 mm trocar was placed in the 

peritoneal cavity.    Following this the laparoscope was placed in the 

peritoneal cavity. The patient was placed in the head-up, right side up position

and then a 5 mm trocar was placed in the right lateral and right subcostal 

position under direct visualization.    A 8 mm trocar was placed in the 

epigastric position.  The gallbladder was grasped in the fundus and 

infundibulum.  Traction  on the gallbladder was placed in the lateral and the 

cephalad positions.  The triangle of Calot  was visualized..   The cystic duct 

was bluntly dissected until the union of the cystic duct and common bile duct 

was seen.   A critical view of safety was achieved.  The cystic duct was then 

divided and sealed with the Harmonic scissors.  A PDS Endoloop was then placed 

throughout the cystic duct stump.  The cystic artery divided and sealed with the

Harmonic scissors.   The gallbladder was then removed from the liver bed using 

Harmonic scissors.   The gallbladder was then extracted through the epigastric 

port site.  Operative field was checked for any bleeding spots and Harmonic 

scissors was used to coagulate the liver bed.    The abdomen was irrigated.   

The trocars were removed.  The skin was closed using interrupted 3-0 Vicryl 

suture.   Dermabond dressing were applied.   The patient tolerated the procedure

well.

## 2020-11-06 VITALS
TEMPERATURE: 98.4 F | HEART RATE: 64 BPM | DIASTOLIC BLOOD PRESSURE: 76 MMHG | SYSTOLIC BLOOD PRESSURE: 122 MMHG | RESPIRATION RATE: 16 BRPM

## 2020-11-06 RX ADMIN — METRONIDAZOLE SCH MLS/HR: 500 INJECTION, SOLUTION INTRAVENOUS at 02:03

## 2020-11-06 RX ADMIN — ACETAMINOPHEN PRN MG: 325 TABLET, FILM COATED ORAL at 03:23

## 2020-11-06 RX ADMIN — HYDROMORPHONE HYDROCHLORIDE PRN MG: 1 INJECTION, SOLUTION INTRAMUSCULAR; INTRAVENOUS; SUBCUTANEOUS at 00:08

## 2020-11-06 RX ADMIN — METRONIDAZOLE SCH MLS/HR: 500 INJECTION, SOLUTION INTRAVENOUS at 08:08

## 2020-11-06 RX ADMIN — HYDROMORPHONE HYDROCHLORIDE PRN MG: 1 INJECTION, SOLUTION INTRAMUSCULAR; INTRAVENOUS; SUBCUTANEOUS at 08:07

## 2020-11-06 RX ADMIN — CEFAZOLIN SCH MLS/HR: 330 INJECTION, POWDER, FOR SOLUTION INTRAMUSCULAR; INTRAVENOUS at 10:17

## 2020-11-06 RX ADMIN — CEFAZOLIN SCH MLS/HR: 330 INJECTION, POWDER, FOR SOLUTION INTRAMUSCULAR; INTRAVENOUS at 02:04

## 2020-11-06 RX ADMIN — CEFAZOLIN SCH MLS/HR: 330 INJECTION, POWDER, FOR SOLUTION INTRAMUSCULAR; INTRAVENOUS at 00:12

## 2020-11-06 RX ADMIN — CEFAZOLIN SCH MLS/HR: 330 INJECTION, POWDER, FOR SOLUTION INTRAMUSCULAR; INTRAVENOUS at 01:54

## 2020-11-06 RX ADMIN — HYDROMORPHONE HYDROCHLORIDE PRN MG: 1 INJECTION, SOLUTION INTRAMUSCULAR; INTRAVENOUS; SUBCUTANEOUS at 02:59

## 2020-11-06 NOTE — P.DS
Providers


Date of admission: 


11/06/20 08:38





Expected date of discharge: 11/06/20


Attending physician: 


Jairo Arriaga





Consults: 





                                        





11/04/20 10:58


Consult Physician Routine 


   Consulting Provider: Radha Moscoso


   Consult Reason/Comments: medical management


   Do you want consulting provider notified?: Yes











Primary care physician: 


JC Birmingham





Hospital Course: 





Discharge diagnosis





1.  Acute cholecystitis status post laparoscopic cholecystectomy





Hospital course





This is a 30-year-old female with a known history of gallstones, IBS, GERD, 

cardiac arrhythmia osteoarthritis.  Patient presents to the emergency room with 

a 2 day history of right upper quadrant abdominal pain with severe vomiting.  

Patient reports that her symptoms did start after eating [employer]. Abdominal

ultrasound there are several shadowing stones within the gallbladder 

dependently.  There is mild gallbladder wall thickening measuring up to 5 mm and

mild dilation of the gallbladder.  Positive Calixto sign.  And she had elevated 

white count.  Patient was treated for acute cholecystitis and is now status post

laparoscopic cholecystectomy.  Patient tolerated surgery well.  She is 

tolerating diet.  She is afebrile.  Her pain is controlled.  She is up and 

ambulating.  She is stable for discharge home.  Please refer to chart for any 

further details.





Physician Assistant note has been reviewed by physician. Signing provider agrees

with the documented findings, assessment, and plan of care. 


Patient Condition at Discharge: Stable





Plan - Discharge Summary


Discharge Rx Participant: No


New Discharge Prescriptions: 


New


   Docusate [Colace] 100 mg PO BID #30 capsule


   Hydrocodone/Acetaminophen [Norco 5-325] 1 tab PO Q4HR PRN 3 Days #18 tab


     PRN Reason: Pain





Continue


   Ibuprofen [Motrin Ib] 800 mg PO Q6H PRN


     PRN Reason: Pain


   Omeprazole 20 mg PO DAILY





Discontinued


   Acetaminophen Tab [Tylenol] 1,000 mg PO Q6H PRN


     PRN Reason: Pain


Discharge Medication List





Ibuprofen [Motrin Ib] 800 mg PO Q6H PRN 09/14/19 [History]


Omeprazole 20 mg PO DAILY 11/04/20 [History]


Docusate [Colace] 100 mg PO BID #30 capsule 11/06/20 [Rx]


Hydrocodone/Acetaminophen [Norco 5-325] 1 tab PO Q4HR PRN 3 Days #18 tab 

11/06/20 [Rx]








Follow up Appointment(s)/Referral(s): 


JC Birmingham III, MD [Primary Care Provider] - 1-2 days


Jairo Arriaga MD [STAFF PHYSICIAN] - 1 Week


Activity/Diet/Wound Care/Special Instructions: 


No driving while taking Norco


No lifting over 10 pounds


You may shower. No soaking or tub baths


Very light activity until you are reevaluated at your follow up appointment with

your surgeon





Diet Low fat


Discharge Disposition: HOME SELF-CARE

## 2020-12-30 NOTE — P.PN
Head, normocephalic, atraumatic, Face, Face within normal limits, Ears, External ears within normal limits, Nose/Nasopharynx, External nose  normal appearance, nares patent, no nasal discharge, Mouth and Throat, Oral cavity appearance normal, Breath odor normal, Lips, Appearance normal Subjective





30-year-old female with a known history of GERD, osteoarthritis, history of 

gallstones and benign colonic polyps, previous history of smoking quit 5 months 

ago came to ER with the complaints of abdominal pain x2 days.  Patient is having

right upper quadrant abdominal pain associated with nausea and vomiting.  Denied

any fever or chills.  Patient reports that her symptoms started after she ate at

[place].


Denied any hematemesis or melena.  No dysuria or hematuria.


Laboratory data showed WBC 17.9, hemoglobin 14.0, platelets 351


Sodium 137, potassium 4.3, chloride 104 and BUN 14 creatinine 0.74


Urinalysis is cloudy with moderate leukocyte esterase and 6 WBCs.





Ultrasound abdomen showed there are several shadowing stones within the gallb

ladder dependently.  There is mild gallbladder wall thickening measuring up to 5

mm and mild dilation of the gallbladder.  Sonographic Calixto sign is positive., 

Suggesting acute cholecystitis.





11/5/2020


Patient is currently sitting on the side of the bed.  Complains of abdominal 

pain.  Patient had laparoscopic cholecystectomy today.  No complaints of fever.


No chest pain or shortness breath.  No dizziness or lightheadedness.


Leukocytosis resolved.  Patient is being continued on antibiotics of Levaquin 

and Flagyl.





11/06/2020


From medical perspective I cannot completely rule out that patient doesn't have 

urinary tract infection patient received couple days of antibiotics for UTI 

patient may need 1 more day of levofloxacin.  I'll leave the decision of 

continuation of antibiotics for cholecystitis to general surgery.  Patient can 

be discharged from medical perspective patient is bit constipated not passing 

gas yet, recommended her OTC or prescription medications for constipation.IV 

fluids were discontinued





Constitutional: Denied any fatigue denied any fever.


Cardio vascular: denied any chest pain, palpitations


Gastrointestinal denied any nausea vomiting


Pulmonary: Denied any shortness of breath cough


Neurologic denied any new focal deficits





All inpatient medications were reviewed and appropriate changes in these 

medications as dictated in the interval history and assessment and plan.





Objective





- Vital Signs


Vital signs: 


                                   Vital Signs











Temp  98.4 F   11/06/20 08:02


 


Pulse  64   11/06/20 08:02


 


Resp  16   11/06/20 09:00


 


BP  122/76   11/06/20 08:02


 


Pulse Ox  97   11/06/20 08:02








                                 Intake & Output











 11/05/20 11/06/20 11/06/20





 18:59 06:59 18:59


 


Intake Total 1880 615 180


 


Output Total 15 500 


 


Balance 1865 115 180


 


Intake:   


 


  IV 1400  


 


  Oral 480 615 180


 


Output:   


 


  Urine  500 


 


  Estimated Blood Loss 15  


 


Other:   


 


  Voiding Method Toilet Toilet Toilet


 


  # Voids 1 0 














- Exam








PHYSICAL EXAMINATION: 





GENERAL: The patient is alert and oriented x3, not in any acute distress. obese 


HEENT: Pupils are round and equally reacting to light. EOMI. No scleral icterus.

No conjunctival pallor. Normocephalic, atraumatic. No pharyngeal erythema. No t

hyromegaly. 


CARDIOVASCULAR: S1 and S2 present. No murmurs, rubs, or gallops. 


PULMONARY: Chest is clear to auscultation, no wheezing or crackles. 


ABDOMEN: Soft, nontender, nondistended, normoactive bowel sounds. No palpable 

organomegaly. surgical site areas appear to be clean


MUSCULOSKELETAL: No joint swelling or deformity.


EXTREMITIES: No cyanosis, clubbing, or pedal edema. 


NEUROLOGICAL: Gross neurological examination did not reveal any focal deficits. 


SKIN: No rashes. 

















- Labs


CBC & Chem 7: 


                                 11/05/20 09:23





                                 11/05/20 09:23


Labs: 


                      Microbiology - Last 24 Hours (Table)











 11/04/20 04:10 Urine Culture - Final





 Urine,Clean Catch 














Assessment and Plan


Plan: 





Acute cholecystitis. Status post laparoscopic cholecystectomy.  POD 1


possibility of urinary tract infection that cannot be completely ruled out will 

need or day of antibiotic can continue levofloxacin for 1 more day if she is 

being discharged.


GERD


obesity





Plan:


as mentioned in the interval history

## 2021-09-02 ENCOUNTER — HOSPITAL ENCOUNTER (OUTPATIENT)
Dept: HOSPITAL 47 - LABWHC1 | Age: 31
Discharge: HOME | End: 2021-09-02
Attending: PHYSICIAN ASSISTANT
Payer: COMMERCIAL

## 2021-09-02 DIAGNOSIS — D86.3: Primary | ICD-10-CM

## 2021-09-02 PROCEDURE — 82310 ASSAY OF CALCIUM: CPT

## 2021-09-02 PROCEDURE — 71046 X-RAY EXAM CHEST 2 VIEWS: CPT

## 2021-09-02 PROCEDURE — 36415 COLL VENOUS BLD VENIPUNCTURE: CPT

## 2021-09-02 PROCEDURE — 82164 ANGIOTENSIN I ENZYME TEST: CPT

## 2021-09-02 NOTE — XR
EXAMINATION TYPE: XR chest 2V

 

DATE OF EXAM: 9/2/2021

 

COMPARISON: None

 

INDICATION: Sarcoidosis

 

TECHNIQUE:  Frontal and lateral views of the chest are obtained.

 

FINDINGS:  

The heart size is normal.  

The pulmonary vasculature is normal.

The lungs are clear.

 

IMPRESSION:  

1. No acute pulmonary process.